# Patient Record
Sex: FEMALE | Race: WHITE | NOT HISPANIC OR LATINO | URBAN - METROPOLITAN AREA
[De-identification: names, ages, dates, MRNs, and addresses within clinical notes are randomized per-mention and may not be internally consistent; named-entity substitution may affect disease eponyms.]

---

## 2019-01-23 ENCOUNTER — OUTPATIENT (OUTPATIENT)
Dept: OUTPATIENT SERVICES | Facility: HOSPITAL | Age: 44
LOS: 1 days | End: 2019-01-23
Payer: COMMERCIAL

## 2019-01-23 LAB
MRSA PCR RESULT.: NEGATIVE — SIGNIFICANT CHANGE UP
S AUREUS DNA NOSE QL NAA+PROBE: NEGATIVE — SIGNIFICANT CHANGE UP

## 2019-01-29 VITALS
RESPIRATION RATE: 16 BRPM | HEART RATE: 79 BPM | HEIGHT: 67 IN | DIASTOLIC BLOOD PRESSURE: 75 MMHG | SYSTOLIC BLOOD PRESSURE: 130 MMHG | WEIGHT: 185.63 LBS | OXYGEN SATURATION: 100 % | TEMPERATURE: 97 F

## 2019-01-29 NOTE — H&P ADULT - PROBLEM SELECTOR PLAN 1
Admit to Ortho  For elective ACDF C5-6  Cleared by Dr. Chairez Admit to Orthopedics   For elective ACDF C4-6  Cleared by Dr. Chairez

## 2019-01-29 NOTE — H&P ADULT - NSHPPHYSICALEXAM_GEN_ALL_CORE
Neck decreased rom 2/2 pain  Rest of PE per MD clearance Neck decreased rom 2/2 pain  MSK: AIN/PIN/U nerves intact to motor BUE. Wrist flex/ext intact BUE. Sensation intact to M/R/U/Msk/Ax nerves and equal BUE. Cap refill brisk. Skin warm and well perfused. Radial pulses palpable.   Rest of PE per MD clearance

## 2019-01-29 NOTE — H&P ADULT - HISTORY OF PRESENT ILLNESS
43F c/o neck pain  Presents today for elective ACDF C5-6. 43F c/o neck pain since June 2018. Pt first noticed pain when she was shutting her car door but pain continued to worsen. Neck pain is associated with right shoulder and arm pain, and right arm intermittent tingling/numbness. Pt denies left arm symptoms. Pt is RHD.   Presents today for elective ACDF C4-6.

## 2019-01-29 NOTE — H&P ADULT - NSHPLABSRESULTS_GEN_ALL_CORE
Preop cbc, bmp, pt/inr, ptt, ua wnl; nasal swab neg  preop cxr wnl per clearance  preop ekg wnl per clearance

## 2019-01-30 ENCOUNTER — INPATIENT (INPATIENT)
Facility: HOSPITAL | Age: 44
LOS: 1 days | Discharge: HOME | DRG: 472 | End: 2019-02-01
Attending: ORTHOPAEDIC SURGERY | Admitting: ORTHOPAEDIC SURGERY
Payer: OTHER MISCELLANEOUS

## 2019-01-30 DIAGNOSIS — Z98.890 OTHER SPECIFIED POSTPROCEDURAL STATES: Chronic | ICD-10-CM

## 2019-01-30 DIAGNOSIS — M50.20 OTHER CERVICAL DISC DISPLACEMENT, UNSPECIFIED CERVICAL REGION: ICD-10-CM

## 2019-01-30 RX ORDER — HYDROMORPHONE HYDROCHLORIDE 2 MG/ML
0.5 INJECTION INTRAMUSCULAR; INTRAVENOUS; SUBCUTANEOUS ONCE
Qty: 0 | Refills: 0 | Status: DISCONTINUED | OUTPATIENT
Start: 2019-01-30 | End: 2019-01-30

## 2019-01-30 RX ORDER — ACETAMINOPHEN 500 MG
975 TABLET ORAL EVERY 8 HOURS
Qty: 0 | Refills: 0 | Status: DISCONTINUED | OUTPATIENT
Start: 2019-01-30 | End: 2019-02-01

## 2019-01-30 RX ORDER — GABAPENTIN 400 MG/1
0 CAPSULE ORAL
Qty: 0 | Refills: 0 | COMMUNITY

## 2019-01-30 RX ORDER — OXYCODONE AND ACETAMINOPHEN 5; 325 MG/1; MG/1
2 TABLET ORAL EVERY 6 HOURS
Qty: 0 | Refills: 0 | Status: DISCONTINUED | OUTPATIENT
Start: 2019-01-30 | End: 2019-02-01

## 2019-01-30 RX ORDER — CEFAZOLIN SODIUM 1 G
2000 VIAL (EA) INJECTION EVERY 8 HOURS
Qty: 0 | Refills: 0 | Status: COMPLETED | OUTPATIENT
Start: 2019-01-30 | End: 2019-01-30

## 2019-01-30 RX ORDER — DOCUSATE SODIUM 100 MG
100 CAPSULE ORAL THREE TIMES A DAY
Qty: 0 | Refills: 0 | Status: DISCONTINUED | OUTPATIENT
Start: 2019-01-30 | End: 2019-02-01

## 2019-01-30 RX ORDER — ACETAMINOPHEN 500 MG
1000 TABLET ORAL ONCE
Qty: 0 | Refills: 0 | Status: COMPLETED | OUTPATIENT
Start: 2019-01-30 | End: 2019-01-30

## 2019-01-30 RX ORDER — OXYCODONE AND ACETAMINOPHEN 5; 325 MG/1; MG/1
1 TABLET ORAL EVERY 4 HOURS
Qty: 0 | Refills: 0 | Status: DISCONTINUED | OUTPATIENT
Start: 2019-01-30 | End: 2019-02-01

## 2019-01-30 RX ORDER — CEFAZOLIN SODIUM 1 G
2000 VIAL (EA) INJECTION EVERY 8 HOURS
Qty: 0 | Refills: 0 | Status: DISCONTINUED | OUTPATIENT
Start: 2019-01-30 | End: 2019-01-30

## 2019-01-30 RX ORDER — GABAPENTIN 400 MG/1
300 CAPSULE ORAL THREE TIMES A DAY
Qty: 0 | Refills: 0 | Status: DISCONTINUED | OUTPATIENT
Start: 2019-01-30 | End: 2019-02-01

## 2019-01-30 RX ORDER — HYDROMORPHONE HYDROCHLORIDE 2 MG/ML
0.5 INJECTION INTRAMUSCULAR; INTRAVENOUS; SUBCUTANEOUS EVERY 4 HOURS
Qty: 0 | Refills: 0 | Status: DISCONTINUED | OUTPATIENT
Start: 2019-01-30 | End: 2019-02-01

## 2019-01-30 RX ORDER — CYCLOBENZAPRINE HYDROCHLORIDE 10 MG/1
10 TABLET, FILM COATED ORAL THREE TIMES A DAY
Qty: 0 | Refills: 0 | Status: DISCONTINUED | OUTPATIENT
Start: 2019-01-30 | End: 2019-02-01

## 2019-01-30 RX ORDER — SENNA PLUS 8.6 MG/1
2 TABLET ORAL AT BEDTIME
Qty: 0 | Refills: 0 | Status: DISCONTINUED | OUTPATIENT
Start: 2019-01-30 | End: 2019-02-01

## 2019-01-30 RX ORDER — SODIUM CHLORIDE 9 MG/ML
1000 INJECTION, SOLUTION INTRAVENOUS
Qty: 0 | Refills: 0 | Status: DISCONTINUED | OUTPATIENT
Start: 2019-01-30 | End: 2019-02-01

## 2019-01-30 RX ADMIN — Medication 400 MILLIGRAM(S): at 17:15

## 2019-01-30 RX ADMIN — SENNA PLUS 2 TABLET(S): 8.6 TABLET ORAL at 21:45

## 2019-01-30 RX ADMIN — Medication 2000 MILLIGRAM(S): at 16:15

## 2019-01-30 RX ADMIN — OXYCODONE AND ACETAMINOPHEN 2 TABLET(S): 5; 325 TABLET ORAL at 20:15

## 2019-01-30 RX ADMIN — OXYCODONE AND ACETAMINOPHEN 2 TABLET(S): 5; 325 TABLET ORAL at 20:45

## 2019-01-30 RX ADMIN — HYDROMORPHONE HYDROCHLORIDE 0.5 MILLIGRAM(S): 2 INJECTION INTRAMUSCULAR; INTRAVENOUS; SUBCUTANEOUS at 22:15

## 2019-01-30 RX ADMIN — HYDROMORPHONE HYDROCHLORIDE 0.5 MILLIGRAM(S): 2 INJECTION INTRAMUSCULAR; INTRAVENOUS; SUBCUTANEOUS at 13:00

## 2019-01-30 RX ADMIN — Medication 2000 MILLIGRAM(S): at 23:38

## 2019-01-30 RX ADMIN — HYDROMORPHONE HYDROCHLORIDE 0.5 MILLIGRAM(S): 2 INJECTION INTRAMUSCULAR; INTRAVENOUS; SUBCUTANEOUS at 12:45

## 2019-01-30 RX ADMIN — HYDROMORPHONE HYDROCHLORIDE 0.5 MILLIGRAM(S): 2 INJECTION INTRAMUSCULAR; INTRAVENOUS; SUBCUTANEOUS at 21:45

## 2019-01-30 RX ADMIN — SODIUM CHLORIDE 100 MILLILITER(S): 9 INJECTION, SOLUTION INTRAVENOUS at 23:38

## 2019-01-30 RX ADMIN — Medication 1000 MILLIGRAM(S): at 17:30

## 2019-01-30 RX ADMIN — HYDROMORPHONE HYDROCHLORIDE 0.5 MILLIGRAM(S): 2 INJECTION INTRAMUSCULAR; INTRAVENOUS; SUBCUTANEOUS at 14:44

## 2019-01-30 RX ADMIN — GABAPENTIN 300 MILLIGRAM(S): 400 CAPSULE ORAL at 21:45

## 2019-01-30 RX ADMIN — HYDROMORPHONE HYDROCHLORIDE 0.5 MILLIGRAM(S): 2 INJECTION INTRAMUSCULAR; INTRAVENOUS; SUBCUTANEOUS at 15:03

## 2019-01-30 RX ADMIN — Medication 100 MILLIGRAM(S): at 21:45

## 2019-01-30 NOTE — BRIEF OPERATIVE NOTE - PROCEDURE
<<-----Click on this checkbox to enter Procedure Anterior cervical discectomy and fusion using allograft, 2 levels  01/30/2019  C4-C6 using bone graft  Active  [facility]

## 2019-01-30 NOTE — DISCHARGE NOTE ADULT - CARE PLAN
Principal Discharge DX:	HNP (herniated nucleus pulposus), cervical  Goal:	improvement s/p ACDF C4-6  Assessment and plan of treatment:	No strenuous activity (bending/twisting), heavy lifting, driving or returning to work until cleared by MD.  Change dressing daily with gauze/tape till post-op day #5, then leave incision open to air.  You may shower post-op day#5, keep incision clean and dry.   Try to have regular bowel movements, take stool softener or laxative if necessary.  May take pepcid or zantac for upset stomach.  May apply ice to affected areas to decrease swelling.  Call to schedule an appt with Dr. Ojeda for follow up, if you have staples or sutures they will be removed in office.  Contact your doctor if you experience: fever greater than 101.5, chills, chest pain, difficulty breathing, redness or excessive drainage around the incision, other concerns.  Follow up with your primary care provider.

## 2019-01-30 NOTE — DISCHARGE NOTE ADULT - MEDICATION SUMMARY - MEDICATIONS TO STOP TAKING
I will STOP taking the medications listed below when I get home from the hospital:    traMADol 50 mg oral tablet

## 2019-01-30 NOTE — DISCHARGE NOTE ADULT - NS AS ACTIVITY OBS
Walking-Outdoors allowed/Stairs allowed/Walking-Indoors allowed/No Heavy lifting/straining/Do not drive or operate machinery

## 2019-01-30 NOTE — DISCHARGE NOTE ADULT - CARE PROVIDER_API CALL
Josseline Ojeda)  Orthopaedic Surgery  [address], [address] [place] [address]  Phone: [phone]  Fax: [phone]

## 2019-01-30 NOTE — DISCHARGE NOTE ADULT - PATIENT PORTAL LINK FT
You can access the [facility], offered by [facility], by registering with the following website: [email or web address]

## 2019-01-30 NOTE — BRIEF OPERATIVE NOTE - ANTIBIOTIC PROTOCOL
Telephone Encounter by Ashley Coronado RN, BSN at 09/22/17 10:05 AM     Author:  Ashley Coronado RN, BSN Service:  (none) Author Type:  Registered Nurse     Filed:  09/22/17 10:06 AM Encounter Date:  9/22/2017 Status:  Signed     :  Ashley Coronado RN, BSN (Registered Nurse)            Please refer to previous message per [facility].    Routed to Dr. Rick:  Please advise.[NZ1.1M]      Revision History        User Key Date/Time User Provider Type Action    > NZ1.1 09/22/17 10:06 AM Ashley Coronado RN, BSN Registered Nurse Sign    M - Manual             Followed protocol

## 2019-01-30 NOTE — DISCHARGE NOTE ADULT - HOSPITAL COURSE
Admitted  Surgery  Juliette-op Antibiotics  Pain control  DVT prophylaxis  OOB Admitted  Surgery ACDF C4-C6   Juliette-op Antibiotics Ancef  Pain control   DVT prophylaxis SCDs  Hemovac monitored   OOB

## 2019-01-30 NOTE — DISCHARGE NOTE ADULT - PLAN OF CARE
improvement s/p ACDF C4-6 No strenuous activity (bending/twisting), heavy lifting, driving or returning to work until cleared by MD.  Change dressing daily with gauze/tape till post-op day #5, then leave incision open to air.  You may shower post-op day#5, keep incision clean and dry.   Try to have regular bowel movements, take stool softener or laxative if necessary.  May take pepcid or zantac for upset stomach.  May apply ice to affected areas to decrease swelling.  Call to schedule an appt with Dr. Ojeda for follow up, if you have staples or sutures they will be removed in office.  Contact your doctor if you experience: fever greater than 101.5, chills, chest pain, difficulty breathing, redness or excessive drainage around the incision, other concerns.  Follow up with your primary care provider.

## 2019-01-30 NOTE — DISCHARGE NOTE ADULT - MEDICATION SUMMARY - MEDICATIONS TO TAKE
I will START or STAY ON the medications listed below when I get home from the hospital:    oxycodone-acetaminophen 5 mg-325 mg oral tablet  -- 1-2 tab(s) by mouth every 4-6 hours, As needed, Severe Pain (7 - 10) MDD:12  -- Indication: For Severe Pain    gabapentin 300 mg oral tablet  -- orally 3 times a day  -- Indication: For RADICULOPATHY    docusate sodium 100 mg oral capsule  -- 1 cap(s) by mouth 3 times a day  -- Indication: For Bowel Regimen

## 2019-01-31 LAB
ANION GAP SERPL CALC-SCNC: 10 MMOL/L — SIGNIFICANT CHANGE UP (ref 5–17)
BASOPHILS NFR BLD AUTO: 0.1 % — SIGNIFICANT CHANGE UP (ref 0–2)
BUN SERPL-MCNC: 6 MG/DL — LOW (ref 7–23)
CALCIUM SERPL-MCNC: 9.2 MG/DL — SIGNIFICANT CHANGE UP (ref 8.4–10.5)
CHLORIDE SERPL-SCNC: 103 MMOL/L — SIGNIFICANT CHANGE UP (ref 96–108)
CO2 SERPL-SCNC: 24 MMOL/L — SIGNIFICANT CHANGE UP (ref 22–31)
CREAT SERPL-MCNC: 0.57 MG/DL — SIGNIFICANT CHANGE UP (ref 0.5–1.3)
GLUCOSE SERPL-MCNC: 107 MG/DL — HIGH (ref 70–99)
HCT VFR BLD CALC: 32.2 % — LOW (ref 34.5–45)
HGB BLD-MCNC: 9.9 G/DL — LOW (ref 11.5–15.5)
LYMPHOCYTES # BLD AUTO: 10.7 % — LOW (ref 13–44)
MCHC RBC-ENTMCNC: 25 PG — LOW (ref 27–34)
MCHC RBC-ENTMCNC: 30.7 G/DL — LOW (ref 32–36)
MCV RBC AUTO: 81.3 FL — SIGNIFICANT CHANGE UP (ref 80–100)
MONOCYTES NFR BLD AUTO: 9.3 % — SIGNIFICANT CHANGE UP (ref 2–14)
NEUTROPHILS NFR BLD AUTO: 79.9 % — HIGH (ref 43–77)
PLATELET # BLD AUTO: 302 K/UL — SIGNIFICANT CHANGE UP (ref 150–400)
POTASSIUM SERPL-MCNC: 3.6 MMOL/L — SIGNIFICANT CHANGE UP (ref 3.5–5.3)
POTASSIUM SERPL-SCNC: 3.6 MMOL/L — SIGNIFICANT CHANGE UP (ref 3.5–5.3)
RBC # BLD: 3.96 M/UL — SIGNIFICANT CHANGE UP (ref 3.8–5.2)
RBC # FLD: 15.3 % — SIGNIFICANT CHANGE UP (ref 10.3–16.9)
SODIUM SERPL-SCNC: 137 MMOL/L — SIGNIFICANT CHANGE UP (ref 135–145)
WBC # BLD: 13.4 K/UL — HIGH (ref 3.8–10.5)
WBC # FLD AUTO: 13.4 K/UL — HIGH (ref 3.8–10.5)

## 2019-01-31 RX ORDER — BENZOCAINE AND MENTHOL 5; 1 G/100ML; G/100ML
1 LIQUID ORAL THREE TIMES A DAY
Qty: 0 | Refills: 0 | Status: DISCONTINUED | OUTPATIENT
Start: 2019-01-31 | End: 2019-02-01

## 2019-01-31 RX ADMIN — BENZOCAINE AND MENTHOL 1 LOZENGE: 5; 1 LIQUID ORAL at 01:41

## 2019-01-31 RX ADMIN — BENZOCAINE AND MENTHOL 1 LOZENGE: 5; 1 LIQUID ORAL at 06:30

## 2019-01-31 RX ADMIN — OXYCODONE AND ACETAMINOPHEN 2 TABLET(S): 5; 325 TABLET ORAL at 19:00

## 2019-01-31 RX ADMIN — GABAPENTIN 300 MILLIGRAM(S): 400 CAPSULE ORAL at 15:48

## 2019-01-31 RX ADMIN — OXYCODONE AND ACETAMINOPHEN 2 TABLET(S): 5; 325 TABLET ORAL at 12:45

## 2019-01-31 RX ADMIN — OXYCODONE AND ACETAMINOPHEN 2 TABLET(S): 5; 325 TABLET ORAL at 12:15

## 2019-01-31 RX ADMIN — Medication 100 MILLIGRAM(S): at 06:30

## 2019-01-31 RX ADMIN — SENNA PLUS 2 TABLET(S): 8.6 TABLET ORAL at 21:23

## 2019-01-31 RX ADMIN — OXYCODONE AND ACETAMINOPHEN 2 TABLET(S): 5; 325 TABLET ORAL at 18:23

## 2019-01-31 RX ADMIN — BENZOCAINE AND MENTHOL 1 LOZENGE: 5; 1 LIQUID ORAL at 11:12

## 2019-01-31 RX ADMIN — HYDROMORPHONE HYDROCHLORIDE 0.5 MILLIGRAM(S): 2 INJECTION INTRAMUSCULAR; INTRAVENOUS; SUBCUTANEOUS at 07:50

## 2019-01-31 RX ADMIN — Medication 100 MILLIGRAM(S): at 21:23

## 2019-01-31 RX ADMIN — CYCLOBENZAPRINE HYDROCHLORIDE 10 MILLIGRAM(S): 10 TABLET, FILM COATED ORAL at 06:31

## 2019-01-31 RX ADMIN — GABAPENTIN 300 MILLIGRAM(S): 400 CAPSULE ORAL at 06:30

## 2019-01-31 RX ADMIN — Medication 100 MILLIGRAM(S): at 15:48

## 2019-01-31 RX ADMIN — GABAPENTIN 300 MILLIGRAM(S): 400 CAPSULE ORAL at 21:23

## 2019-01-31 RX ADMIN — HYDROMORPHONE HYDROCHLORIDE 0.5 MILLIGRAM(S): 2 INJECTION INTRAMUSCULAR; INTRAVENOUS; SUBCUTANEOUS at 08:20

## 2019-01-31 NOTE — SWALLOW BEDSIDE ASSESSMENT ADULT - SLP GENERAL OBSERVATIONS
Pt was received alert in bed. Family at bedside. Pt reported odynophagia when swallowing and inability to swallow cold items.

## 2019-01-31 NOTE — SWALLOW BEDSIDE ASSESSMENT ADULT - MUCOSAL QUALITY
Moist oral cavity. Voice was judged to be of reduced intensity but pt reported that voice has improved since yesterday.

## 2019-01-31 NOTE — SWALLOW BEDSIDE ASSESSMENT ADULT - PHARYNGEAL PHASE
Unable to palpate/auscultate 2/2 neck collar. However, given recency of ACDF, suspect some level of edema that may be contributing to pt's c/o difficulty swallowing. Cough only on initial ice-chip. Pt stated that she was unable to swallow the melted ice-chip and she spit it out, but was able to swallow all other trials despite some pain/discomfort when swallowing

## 2019-01-31 NOTE — SWALLOW BEDSIDE ASSESSMENT ADULT - ADDITIONAL RECOMMENDATIONS
This SVC will f/u to monitor tolerance. Will consider need for instrumental evaluation of swallow if pt's ability to tolerate PO does not improve in the next 24 hours.

## 2019-01-31 NOTE — SWALLOW BEDSIDE ASSESSMENT ADULT - SWALLOW EVAL: DIAGNOSIS
Pt p/w likely mild pharyngeal dysphagia and odynophagia 2/2 recent ACDF. However, no overt s/s of airway protection deficits were observed given small spoon sips. Given this, pls allow pt to continue current diet of clear liquids. She would benefit from spoon sips to limit bolus volume at this time.

## 2019-02-01 VITALS
OXYGEN SATURATION: 99 % | DIASTOLIC BLOOD PRESSURE: 78 MMHG | HEART RATE: 113 BPM | RESPIRATION RATE: 18 BRPM | SYSTOLIC BLOOD PRESSURE: 123 MMHG | TEMPERATURE: 97 F

## 2019-02-01 DIAGNOSIS — D64.9 ANEMIA, UNSPECIFIED: ICD-10-CM

## 2019-02-01 DIAGNOSIS — M54.2 CERVICALGIA: ICD-10-CM

## 2019-02-01 LAB
ANION GAP SERPL CALC-SCNC: 8 MMOL/L — SIGNIFICANT CHANGE UP (ref 5–17)
BASOPHILS NFR BLD AUTO: 0.2 % — SIGNIFICANT CHANGE UP (ref 0–2)
BUN SERPL-MCNC: 9 MG/DL — SIGNIFICANT CHANGE UP (ref 7–23)
CALCIUM SERPL-MCNC: 9 MG/DL — SIGNIFICANT CHANGE UP (ref 8.4–10.5)
CHLORIDE SERPL-SCNC: 102 MMOL/L — SIGNIFICANT CHANGE UP (ref 96–108)
CO2 SERPL-SCNC: 27 MMOL/L — SIGNIFICANT CHANGE UP (ref 22–31)
CREAT SERPL-MCNC: 0.67 MG/DL — SIGNIFICANT CHANGE UP (ref 0.5–1.3)
EOSINOPHIL NFR BLD AUTO: 0.3 % — SIGNIFICANT CHANGE UP (ref 0–6)
GLUCOSE SERPL-MCNC: 101 MG/DL — HIGH (ref 70–99)
HCT VFR BLD CALC: 31.1 % — LOW (ref 34.5–45)
HGB BLD-MCNC: 9.5 G/DL — LOW (ref 11.5–15.5)
LYMPHOCYTES # BLD AUTO: 15.9 % — SIGNIFICANT CHANGE UP (ref 13–44)
MCHC RBC-ENTMCNC: 24.7 PG — LOW (ref 27–34)
MCHC RBC-ENTMCNC: 30.5 G/DL — LOW (ref 32–36)
MCV RBC AUTO: 81 FL — SIGNIFICANT CHANGE UP (ref 80–100)
MONOCYTES NFR BLD AUTO: 12.8 % — SIGNIFICANT CHANGE UP (ref 2–14)
NEUTROPHILS NFR BLD AUTO: 70.8 % — SIGNIFICANT CHANGE UP (ref 43–77)
PLATELET # BLD AUTO: 279 K/UL — SIGNIFICANT CHANGE UP (ref 150–400)
POTASSIUM SERPL-MCNC: 3.4 MMOL/L — LOW (ref 3.5–5.3)
POTASSIUM SERPL-SCNC: 3.4 MMOL/L — LOW (ref 3.5–5.3)
RBC # BLD: 3.84 M/UL — SIGNIFICANT CHANGE UP (ref 3.8–5.2)
RBC # FLD: 15.8 % — SIGNIFICANT CHANGE UP (ref 10.3–16.9)
SODIUM SERPL-SCNC: 137 MMOL/L — SIGNIFICANT CHANGE UP (ref 135–145)
WBC # BLD: 10.5 K/UL — SIGNIFICANT CHANGE UP (ref 3.8–10.5)
WBC # FLD AUTO: 10.5 K/UL — SIGNIFICANT CHANGE UP (ref 3.8–10.5)

## 2019-02-01 PROCEDURE — 87641 MR-STAPH DNA AMP PROBE: CPT

## 2019-02-01 PROCEDURE — 85025 COMPLETE CBC W/AUTO DIFF WBC: CPT

## 2019-02-01 PROCEDURE — 92526 ORAL FUNCTION THERAPY: CPT

## 2019-02-01 PROCEDURE — C1713: CPT

## 2019-02-01 PROCEDURE — 86900 BLOOD TYPING SEROLOGIC ABO: CPT

## 2019-02-01 PROCEDURE — 80048 BASIC METABOLIC PNL TOTAL CA: CPT

## 2019-02-01 PROCEDURE — 76000 FLUOROSCOPY <1 HR PHYS/QHP: CPT

## 2019-02-01 PROCEDURE — 36415 COLL VENOUS BLD VENIPUNCTURE: CPT

## 2019-02-01 PROCEDURE — 92610 EVALUATE SWALLOWING FUNCTION: CPT

## 2019-02-01 PROCEDURE — 95940 IONM IN OPERATNG ROOM 15 MIN: CPT

## 2019-02-01 PROCEDURE — C1889: CPT

## 2019-02-01 PROCEDURE — 86901 BLOOD TYPING SEROLOGIC RH(D): CPT

## 2019-02-01 PROCEDURE — 86850 RBC ANTIBODY SCREEN: CPT

## 2019-02-01 PROCEDURE — 99254 IP/OBS CNSLTJ NEW/EST MOD 60: CPT

## 2019-02-01 RX ORDER — SODIUM CHLORIDE 9 MG/ML
1000 INJECTION INTRAMUSCULAR; INTRAVENOUS; SUBCUTANEOUS ONCE
Qty: 0 | Refills: 0 | Status: COMPLETED | OUTPATIENT
Start: 2019-02-01 | End: 2019-02-01

## 2019-02-01 RX ORDER — TRAMADOL HYDROCHLORIDE 50 MG/1
0 TABLET ORAL
Qty: 0 | Refills: 0 | COMMUNITY

## 2019-02-01 RX ORDER — DOCUSATE SODIUM 100 MG
1 CAPSULE ORAL
Qty: 0 | Refills: 0 | COMMUNITY
Start: 2019-02-01

## 2019-02-01 RX ADMIN — OXYCODONE AND ACETAMINOPHEN 2 TABLET(S): 5; 325 TABLET ORAL at 00:25

## 2019-02-01 RX ADMIN — GABAPENTIN 300 MILLIGRAM(S): 400 CAPSULE ORAL at 06:29

## 2019-02-01 RX ADMIN — OXYCODONE AND ACETAMINOPHEN 1 TABLET(S): 5; 325 TABLET ORAL at 07:05

## 2019-02-01 RX ADMIN — GABAPENTIN 300 MILLIGRAM(S): 400 CAPSULE ORAL at 13:34

## 2019-02-01 RX ADMIN — OXYCODONE AND ACETAMINOPHEN 2 TABLET(S): 5; 325 TABLET ORAL at 12:49

## 2019-02-01 RX ADMIN — OXYCODONE AND ACETAMINOPHEN 1 TABLET(S): 5; 325 TABLET ORAL at 06:29

## 2019-02-01 RX ADMIN — OXYCODONE AND ACETAMINOPHEN 2 TABLET(S): 5; 325 TABLET ORAL at 11:17

## 2019-02-01 RX ADMIN — Medication 100 MILLIGRAM(S): at 06:29

## 2019-02-01 RX ADMIN — SODIUM CHLORIDE 2000 MILLILITER(S): 9 INJECTION INTRAMUSCULAR; INTRAVENOUS; SUBCUTANEOUS at 11:06

## 2019-02-01 RX ADMIN — OXYCODONE AND ACETAMINOPHEN 2 TABLET(S): 5; 325 TABLET ORAL at 19:48

## 2019-02-01 RX ADMIN — Medication 100 MILLIGRAM(S): at 13:34

## 2019-02-01 RX ADMIN — OXYCODONE AND ACETAMINOPHEN 2 TABLET(S): 5; 325 TABLET ORAL at 18:23

## 2019-02-01 RX ADMIN — OXYCODONE AND ACETAMINOPHEN 2 TABLET(S): 5; 325 TABLET ORAL at 00:46

## 2019-02-01 NOTE — PROGRESS NOTE ADULT - SUBJECTIVE AND OBJECTIVE BOX
Ortho Note    Pt comfortable complaining of neck soreness and dysphagia.  Pt denies CP, SOB, N/V, numbness/tingling down upper extremities b/l.    Vital Signs Last 24 Hrs  T(C): 37.6 (01-31-19 @ 13:59), Max: 37.6 (01-31-19 @ 13:59)  T(F): 99.7 (01-31-19 @ 13:59), Max: 99.7 (01-31-19 @ 13:59)  HR: 128 (01-31-19 @ 13:59) (128 - 128)  BP: 118/65 (01-31-19 @ 13:59) (118/65 - 118/65)  BP(mean): --  RR: 18 (01-31-19 @ 13:59) (18 - 18)  SpO2: 100% (01-31-19 @ 13:59) (100% - 100%)      General: Pt Alert and oriented, NAD  DSG neck gauze and tegaderm C/D/I  HV x1 in place  Pulses: radial pulses +2 b/l  Sensation:  gross sensation to light touch intact throughout upper extremities b/l  Motor: Triceps/Biceps/ 5/5 b/l  AIN/PIN/Ulnar nerve intact b/l                          9.9    13.4  )-----------( 302      ( 31 Jan 2019 06:31 )             32.2   31 Jan 2019 06:31    137    |  103    |  6      ----------------------------<  107    3.6     |  24     |  0.57           A/P: 43yFemale POD#1 s/p ACDF C4-C6  - Stable  - Pain Control  - DVT ppx: scds  - OOB, WBS: WBAT  - f/u speech and swallow consult  - Keep soft collar in place  - HVx1 removed    Ortho Pager [phone]
Ortho Note    Post-operative day #2 s/p ACDF C4-C5, C5-C6    Subjective:     Patient seen and examined. Patient complaining of some pain although controlled with medication. Patient was having dysphagia yesterday, states this has much improved today. The patient states she is having some trouble raising her right arm but denies arm pain/numbness/tingling/weakness.  Denies chest pain, SOB, N/V, numbness/tingling.    Objective:    Tachycardic to the 150's o/n, tachycardic to the 120's today.    Vital Signs Last 24 Hrs  T(C): 37.4 (02-01-19 @ 10:14), Max: 37.4 (02-01-19 @ 10:14)  T(F): 99.3 (02-01-19 @ 10:14), Max: 99.3 (02-01-19 @ 10:14)  HR: 118 (02-01-19 @ 10:14) (74 - 118)  BP: 134/86 (02-01-19 @ 10:14) (134/86 - 143/64)  RR: 18 (02-01-19 @ 10:14) (18 - 18)  SpO2: 100% (02-01-19 @ 10:14) (97% - 100%)    PE:  General: Patient alert and oriented, NAD  Dressing: C/D/I neck with soft collar, hvx1 d/c'd  Pulses: radial pulses palpable bilateral upper extremities  Sensation: intact to bilateral upper extremities   Motor: /biceps/triceps strength 5/5                           9.5    10.5  )-----------( 279      ( 01 Feb 2019 06:43 )             31.1   01 Feb 2019 06:43    137    |  102    |  9      ----------------------------<  101    3.4     |  27     |  0.67     Ca    9.0        01 Feb 2019 06:43        A/P: 43yFemale POD #2 s/p ACDF C4-C5, C5-C6  1. Pain control as needed  2. Patient was seen and evaluated by medicine Dr. Muñoz who recommended 1L NS bolus for tachycardia, will monitor   3. follow up speech and swallow c/s  4. Discussed patient's right arm decreased ROM with Dr. Ojeda who said symptoms will improve with time, will follow as outpatient  5. DVT prophylaxis:  SCDs  6. PT, WBS: WBAT  7. Dispo: pending    Ortho Pager [phone]
Ortho Note    Pt comfortable without complaints, pain controlled  Denies CP, SOB, N/V, numbness/tingling     Vital Signs Last 24 Hrs  T(C): 36.9 (02-01-19 @ 09:14), Max: 36.9 (02-01-19 @ 05:32)  T(F): 98.5 (02-01-19 @ 09:14), Max: 98.5 (02-01-19 @ 09:14)  HR: 74 (02-01-19 @ 09:14) (74 - 96)  BP: 143/64 (02-01-19 @ 09:14) (134/81 - 143/64)  BP(mean): --  RR: 18 (02-01-19 @ 09:14) (17 - 18)  SpO2: 97% (02-01-19 @ 09:14) (97% - 100%)  AVSS    General: Pt Alert and oriented, NAD  Neck DSG C/D/I  Soft collar on  Pulses: 2+ radial   Sensation: C5-T1 intact  Motor: +D/T/B/WE/WF/IO  fingers wwp                          9.5    10.5  )-----------( 279      ( 01 Feb 2019 06:43 )             31.1   01 Feb 2019 06:43    137    |  102    |  9      ----------------------------<  101    3.4     |  27     |  0.67     Ca    9.0        01 Feb 2019 06:43        A/P: 43yFemale POD#1 s/p ACDF C4-C6  - Stable  - Pain Control  - DVT ppx: SCDs  - PT, WBS: WBAT  - f/u AM labs  - tachycardia improved   - dispo home    Ortho Pager [phone]
Ortho Note    Pt comfortable without complaints, pain controlled  Denies CP, SOB, N/V, numbness/tingling     Vital Signs Last 24 Hrs  T(C): 37.4 (01-31-19 @ 07:48), Max: 37.4 (01-31-19 @ 07:48)  T(F): 99.4 (01-31-19 @ 07:48), Max: 99.4 (01-31-19 @ 07:48)  HR: 95 (01-31-19 @ 07:48) (95 - 103)  BP: 157/81 (01-31-19 @ 07:48) (135/82 - 157/81)  BP(mean): --  RR: 18 (01-31-19 @ 07:48) (17 - 18)  SpO2: 100% (01-31-19 @ 07:48) (100% - 100%)    General: Pt Alert and oriented, NAD  Neck DSG C/D/I  Soft collar on  HVx1 with good suction  Pulses: 2+ radial   Sensation: C5-T1 intact  Motor: +D/T/B/WE/WF/IO  fingers wwp                          9.9    13.4  )-----------( 302      ( 31 Jan 2019 06:31 )             32.2   31 Jan 2019 06:31    137    |  103    |  6      ----------------------------<  107    3.6     |  24     |  0.57     Ca    9.2        31 Jan 2019 06:31        A/P: 43yFemale POD#1 s/p ACDF C4-C6  - Stable  - Pain Control  - DVT ppx: SCDs  - PT, WBS: WBAT  - f/u AM labs  - DC drain  - dispo home    Ortho Pager [phone]
Orthopedics Post Op Check    Procedure: ACDF C4-C6  Surgeon: Dr. Ojeda    Pt comfortable, without complaints. Pain well controlled in PACU. Pt comfortable.   Denies CP, SOB, N/V, numbness/tingling     Vital Signs Last 24 Hrs  T(C): 35.6 (30 Jan 2019 11:44), Max: 35.6 (30 Jan 2019 11:44)  T(F): 96 (30 Jan 2019 11:44), Max: 96 (30 Jan 2019 11:44)  HR: 76 (30 Jan 2019 13:00) (76 - 90)  BP: 161/95 (30 Jan 2019 12:59) (146/86 - 169/97)  BP(mean): 128 (30 Jan 2019 12:14) (114 - 128)  RR: 16 (30 Jan 2019 12:59) (14 - 17)  SpO2: 100% (30 Jan 2019 12:59) (100% - 100%)  AVSS, NAD      General: Pt Alert and oriented, comfortable  Dressing C/D/I, HV x 1 in place.   Sensation intact and equal M/R/U BUE  Motor ain/pin/u nerves intact to motor BUE.   Cap refill brisk. Skin warm and well perfused.     A/P: 43yFemale POD#0 s/p ACDF C4-C6 doing well in PACU  - Stable  - Pain Control  - DVT ppx:SCDs  - Juliette-op abx:Ancef  - PT, WBS: WBAT  - F/U AM Labs  -Will monitor drain output

## 2019-02-01 NOTE — CONSULT NOTE ADULT - ASSESSMENT
44yo F, no significant PMH other than GERD and chronic neck pain that failed outpatient therapy. Patient has signs of cervical radiculopathy. Admitted for elective ACDF C4-C6, now POD-2. Having sustained tachycardia. Medicine consulted for sinus tach.    1) Sinus tachycardia --likely related to dehydration and pain.   * s/p 1L IVF bolused with improved HR.   * Encouraged to drink and eat.   * Pain control.    2) Post-op status  * OOB-C  * PT eval.   * IS  * Pain control    3) VTEppx  * Ambulatory.     Medically optimized for discharge home.

## 2019-02-01 NOTE — CONSULT NOTE ADULT - SUBJECTIVE AND OBJECTIVE BOX
CC: Feeling well.   moderate pain in neck.   Otherwise no complaints.   Denies cp, sob, dizziness, n/v/d/c.   Rest of ROS negative.     Vital Signs Last 24 Hrs  T(C): 36.3 (01 Feb 2019 14:39), Max: 38 (31 Jan 2019 18:21)  T(F): 97.3 (01 Feb 2019 14:39), Max: 100.4 (31 Jan 2019 18:21)  HR: 113 (01 Feb 2019 14:39) (74 - 120)  BP: 123/78 (01 Feb 2019 14:39) (109/69 - 143/64)  BP(mean): --  RR: 18 (01 Feb 2019 14:39) (17 - 19)  SpO2: 99% (01 Feb 2019 14:39) (97% - 100%)    PHYSICAL EXAMINATION  * General: Not in acute distress. Awake and alert. Lying comfortably in bed.  * Head: Normocephalic, atraumatic.  * Lungs: Clear to auscultation, no rales, no wheezes.  * Cardio: Regular rate and rhythm, no murmurs, no rubs, no gallops. Good peripheral pulses.  * Abdomen: Soft, non-tender, non-distended, tympanic to percussion, no rebound, no guarding, no rigidity. Bowel sounds present. No suprapubic or CVA tenderness.  * Extremities: Acyanotic, no edema.  * Skin: Dry skin, and mucous membranes.   * Neuro: Alert and oriented x 3. No focal deficits. Motor strength is 5/5 throughout. Sensation intact. Cranial nerves II-XII grossly intact.                          9.5    10.5  )-----------( 279      ( 01 Feb 2019 06:43 )             31.1   02-01    137  |  102  |  9   ----------------------------<  101<H>  3.4<L>   |  27  |  0.67    Ca    9.0      01 Feb 2019 06:43    MEDICATIONS  (STANDING):  docusate sodium 100 milliGRAM(s) Oral three times a day  gabapentin 300 milliGRAM(s) Oral three times a day  lactated ringers. 1000 milliLiter(s) (100 mL/Hr) IV Continuous <Continuous>  senna 2 Tablet(s) Oral at bedtime    MEDICATIONS  (PRN):  acetaminophen   Tablet .. 975 milliGRAM(s) Oral every 8 hours PRN Temp greater or equal to 38C (100.4F), Mild Pain (1 - 3)  benzocaine 15 mG/menthol 3.6 mG Lozenge 1 Lozenge Oral three times a day PRN Sore Throat  cyclobenzaprine 10 milliGRAM(s) Oral three times a day PRN Muscle Spasm  HYDROmorphone  Injectable 0.5 milliGRAM(s) IV Push every 4 hours PRN Breakthrough pain  oxyCODONE    5 mG/acetaminophen 325 mG 1 Tablet(s) Oral every 4 hours PRN Moderate Pain (4 - 6)  oxyCODONE    5 mG/acetaminophen 325 mG 2 Tablet(s) Oral every 6 hours PRN Severe Pain (7 - 10)

## 2019-02-01 NOTE — CONSULT NOTE ADULT - SUBJECTIVE AND OBJECTIVE BOX
ÁNGEL MILES      Patient is a 43y old  Female who presents with a chief complaint of neck pain (01 Feb 2019 17:04)      HPI:  43F c/o neck pain since June 2018. Pt first noticed pain when she was shutting her car door but pain continued to worsen. Neck pain is associated with right shoulder and arm pain, and right arm intermittent tingling/numbness. Pt denies left arm symptoms. Pt is RHD.   Presents today for elective ACDF C4-6. (29 Jan 2019 14:46)      Addl  Medical issues: s/p C disc      HEALTH ISSUES - PROBLEM Dx:  HNP (herniated nucleus pulposus), cervical: HNP (herniated nucleus pulposus), cervical            MEDICATIONS  (STANDING):  docusate sodium 100 milliGRAM(s) Oral three times a day  gabapentin 300 milliGRAM(s) Oral three times a day  lactated ringers. 1000 milliLiter(s) (100 mL/Hr) IV Continuous <Continuous>  senna 2 Tablet(s) Oral at bedtime    MEDICATIONS  (PRN):  acetaminophen   Tablet .. 975 milliGRAM(s) Oral every 8 hours PRN Temp greater or equal to 38C (100.4F), Mild Pain (1 - 3)  benzocaine 15 mG/menthol 3.6 mG Lozenge 1 Lozenge Oral three times a day PRN Sore Throat  cyclobenzaprine 10 milliGRAM(s) Oral three times a day PRN Muscle Spasm  HYDROmorphone  Injectable 0.5 milliGRAM(s) IV Push every 4 hours PRN Breakthrough pain  oxyCODONE    5 mG/acetaminophen 325 mG 1 Tablet(s) Oral every 4 hours PRN Moderate Pain (4 - 6)  oxyCODONE    5 mG/acetaminophen 325 mG 2 Tablet(s) Oral every 6 hours PRN Severe Pain (7 - 10)          PAST MEDICAL & SURGICAL HISTORY:  Neck pain: cervical spine, work related injury  GERD (gastroesophageal reflux disease)  Anemia  H/O colonoscopy      REVIEW OF SYSTEMS:  [x] As per HPI          Reviewed   no change                            Changes noted  CONSTITUTIONAL: No fever, weight loss, or fatigue  RESPIRATORY: No cough, wheezing, chills or hemoptysis; No Shortness of Breath  CARDIOVASCULAR: No chest pain, palpitations, dizziness, or leg swelling  GASTROINTESTINAL: No abdominal or epigastric pain. No nausea, vomiting, or hematemesis; No diarrhea or constipation. No melena or hematochezia.  MUSCULOSKELETAL:neck pain  Neuro:   Grossly  Negative  Psych        Awake  alert  [x] All others negative	  [ ] Unable to obtain      Vital Signs Last 24 Hrs  T(C): 36.3 (01 Feb 2019 14:39), Max: 37.4 (01 Feb 2019 10:14)  T(F): 97.3 (01 Feb 2019 14:39), Max: 99.3 (01 Feb 2019 10:14)  HR: 113 (01 Feb 2019 14:39) (74 - 118)  BP: 123/78 (01 Feb 2019 14:39) (109/69 - 143/64)  BP(mean): --  RR: 18 (01 Feb 2019 14:39) (17 - 18)  SpO2: 99% (01 Feb 2019 14:39) (97% - 100%)    PHYSICAL EXAM:      Constitutional:awake    Eyes:    ENMT:    Neck: s/p surg disc    Breasts:    Back:    Respiratory:clear to A    Cardiovascular:s1s2    Gastrointestinal:soft BS present    Genitourinary:    Rectal:    Extremities:from    Vascular:    Neurological:grossly neg    Skin:    Lymph Nodes:    Musculoskeletal:fair MS str    Psychiatric:awake/alert                              9.5    10.5  )-----------( 279      ( 01 Feb 2019 06:43 )             31.1     02-01    137  |  102  |  9   ----------------------------<  101<H>  3.4<L>   |  27  |  0.67    Ca    9.0      01 Feb 2019 06:43            CAPILLARY BLOOD GLUCOSE          I&O's Summary    01 Feb 2019 07:01  -  01 Feb 2019 20:08  --------------------------------------------------------  IN: 2000 mL / OUT: 0 mL / NET: 2000 mL            ASSESSMENT/PLAN/RECOMMENDATIONS

## 2019-02-05 DIAGNOSIS — M48.02 SPINAL STENOSIS, CERVICAL REGION: ICD-10-CM

## 2019-02-05 DIAGNOSIS — M50.221 OTHER CERVICAL DISC DISPLACEMENT AT C4-C5 LEVEL: ICD-10-CM

## 2019-02-05 DIAGNOSIS — M50.121 CERVICAL DISC DISORDER AT C4-C5 LEVEL WITH RADICULOPATHY: ICD-10-CM

## 2019-02-05 DIAGNOSIS — E86.0 DEHYDRATION: ICD-10-CM

## 2019-02-05 DIAGNOSIS — K21.9 GASTRO-ESOPHAGEAL REFLUX DISEASE WITHOUT ESOPHAGITIS: ICD-10-CM

## 2019-02-05 DIAGNOSIS — D64.9 ANEMIA, UNSPECIFIED: ICD-10-CM

## 2019-02-05 DIAGNOSIS — M50.021 CERVICAL DISC DISORDER AT C4-C5 LEVEL WITH MYELOPATHY: ICD-10-CM

## 2019-02-05 DIAGNOSIS — R00.0 TACHYCARDIA, UNSPECIFIED: ICD-10-CM

## 2019-04-22 ENCOUNTER — OUTPATIENT (OUTPATIENT)
Dept: OUTPATIENT SERVICES | Facility: HOSPITAL | Age: 44
LOS: 1 days | End: 2019-04-22
Payer: OTHER MISCELLANEOUS

## 2019-04-22 DIAGNOSIS — Z98.890 OTHER SPECIFIED POSTPROCEDURAL STATES: Chronic | ICD-10-CM

## 2019-04-22 PROBLEM — D64.9 ANEMIA, UNSPECIFIED: Chronic | Status: ACTIVE | Noted: 2019-01-29

## 2019-04-22 PROBLEM — M54.2 CERVICALGIA: Chronic | Status: ACTIVE | Noted: 2019-01-29

## 2019-04-22 PROBLEM — K21.9 GASTRO-ESOPHAGEAL REFLUX DISEASE WITHOUT ESOPHAGITIS: Chronic | Status: ACTIVE | Noted: 2019-01-29

## 2019-04-22 PROCEDURE — 72050 X-RAY EXAM NECK SPINE 4/5VWS: CPT

## 2019-04-22 PROCEDURE — 72050 X-RAY EXAM NECK SPINE 4/5VWS: CPT | Mod: 26

## 2019-06-03 ENCOUNTER — OUTPATIENT (OUTPATIENT)
Dept: OUTPATIENT SERVICES | Facility: HOSPITAL | Age: 44
LOS: 1 days | End: 2019-06-03
Payer: OTHER MISCELLANEOUS

## 2019-06-03 DIAGNOSIS — Z98.890 OTHER SPECIFIED POSTPROCEDURAL STATES: Chronic | ICD-10-CM

## 2019-06-03 PROCEDURE — 72040 X-RAY EXAM NECK SPINE 2-3 VW: CPT

## 2019-06-03 PROCEDURE — 72040 X-RAY EXAM NECK SPINE 2-3 VW: CPT | Mod: 26

## 2019-07-22 ENCOUNTER — OUTPATIENT (OUTPATIENT)
Dept: OUTPATIENT SERVICES | Facility: HOSPITAL | Age: 44
LOS: 1 days | End: 2019-07-22
Payer: OTHER MISCELLANEOUS

## 2019-07-22 DIAGNOSIS — Z98.890 OTHER SPECIFIED POSTPROCEDURAL STATES: Chronic | ICD-10-CM

## 2019-07-22 PROCEDURE — 72050 X-RAY EXAM NECK SPINE 4/5VWS: CPT | Mod: 26

## 2019-07-22 PROCEDURE — 72050 X-RAY EXAM NECK SPINE 4/5VWS: CPT

## 2021-10-14 NOTE — CONSULT NOTE ADULT - CONSULT REASON
Sinus tachycardia.     42yo F, no significant PMH other than GERD and chronic neck pain that failed outpatient therapy. Patient has signs of cervical radiculopathy. Admitted for elective ACDF C4-C6, now POD-2. Having sustained tachycardia. Medicine consulted for sinus tach.     PAST MEDICAL & SURGICAL HISTORY:  Neck pain: cervical spine, work related injury  GERD (gastroesophageal reflux disease)  Anemia  H/O colonoscopy    Social: Denies etoh, smoking, drugs.     Allergies: NKDA No

## 2022-11-04 NOTE — PATIENT PROFILE ADULT - NSPROGENBLOODRESTRICT_GEN_A_NUR
none Consent (Near Eyelid Margin)/Introductory Paragraph: The rationale for Mohs was explained to the patient and consent was obtained. The risks, benefits and alternatives to therapy were discussed in detail. Specifically, the risks of ectropion or eyelid deformity, infection, scarring, bleeding, prolonged wound healing, incomplete removal, allergy to anesthesia, nerve injury and recurrence were addressed. Prior to the procedure, the treatment site was clearly identified and confirmed by the patient. All components of Universal Protocol/PAUSE Rule completed.
